# Patient Record
Sex: MALE | Race: WHITE | NOT HISPANIC OR LATINO | Employment: FULL TIME | URBAN - METROPOLITAN AREA
[De-identification: names, ages, dates, MRNs, and addresses within clinical notes are randomized per-mention and may not be internally consistent; named-entity substitution may affect disease eponyms.]

---

## 2018-09-19 ENCOUNTER — HISTORICAL (OUTPATIENT)
Dept: ADMINISTRATIVE | Facility: HOSPITAL | Age: 58
End: 2018-09-19

## 2018-09-19 LAB
ABS NEUT (OLG): 3.2
ALBUMIN SERPL-MCNC: 4.5 GM/DL (ref 3.4–5)
ALBUMIN/GLOB SERPL: 1.96 {RATIO} (ref 1.5–2.5)
ALP SERPL-CCNC: 44 UNIT/L (ref 38–126)
ALT SERPL-CCNC: 15 UNIT/L (ref 7–52)
APPEARANCE, UA: CLEAR
AST SERPL-CCNC: 15 UNIT/L (ref 15–37)
BACTERIA #/AREA URNS AUTO: NORMAL /HPF
BILIRUB SERPL-MCNC: 0.7 MG/DL (ref 0.2–1)
BILIRUB UR QL STRIP: NEGATIVE MG/DL
BILIRUBIN DIRECT+TOT PNL SERPL-MCNC: 0.2 MG/DL (ref 0–0.5)
BILIRUBIN DIRECT+TOT PNL SERPL-MCNC: 0.5 MG/DL
BUN SERPL-MCNC: 18 MG/DL (ref 7–18)
CALCIUM SERPL-MCNC: 9.3 MG/DL (ref 8.5–10)
CHLORIDE SERPL-SCNC: 108 MMOL/L (ref 98–107)
CHOLEST SERPL-MCNC: 120 MG/DL (ref 0–200)
CHOLEST/HDLC SERPL: 3.1 {RATIO}
CO2 SERPL-SCNC: 24 MMOL/L (ref 21–32)
COLOR UR: YELLOW
CREAT SERPL-MCNC: 1.17 MG/DL (ref 0.6–1.3)
ERYTHROCYTE [DISTWIDTH] IN BLOOD BY AUTOMATED COUNT: 13.2 % (ref 11.5–17)
GLOBULIN SER-MCNC: 2.3 GM/DL (ref 1.2–3)
GLUCOSE (UA): NEGATIVE MG/DL
GLUCOSE SERPL-MCNC: 119 MG/DL (ref 74–106)
HCT VFR BLD AUTO: 43.2 % (ref 42–52)
HDLC SERPL-MCNC: 39 MG/DL (ref 35–60)
HGB BLD-MCNC: 14.3 GM/DL (ref 14–18)
HGB UR QL STRIP: NEGATIVE UNIT/L
KETONES UR QL STRIP: NEGATIVE MG/DL
LDLC SERPL CALC-MCNC: 78 MG/DL (ref 0–129)
LEUKOCYTE ESTERASE UR QL STRIP: NEGATIVE UNIT/L
LYMPHOCYTES # BLD AUTO: 2 X10(3)/MCL (ref 0.6–3.4)
LYMPHOCYTES NFR BLD AUTO: 35.1 % (ref 13–40)
MCH RBC QN AUTO: 31.6 PG (ref 27–31.2)
MCHC RBC AUTO-ENTMCNC: 33 GM/DL (ref 32–36)
MCV RBC AUTO: 95 FL (ref 80–94)
MONOCYTES # BLD AUTO: 0.4 X10(3)/MCL (ref 0–1.8)
MONOCYTES NFR BLD AUTO: 7.6 % (ref 0.1–24)
NEUTROPHILS NFR BLD AUTO: 57.3 % (ref 47–80)
NITRITE UR QL STRIP.AUTO: NEGATIVE
PH UR STRIP: 6.5 [PH]
PLATELET # BLD AUTO: 255 X10(3)/MCL (ref 130–400)
PMV BLD AUTO: 9.9 FL
POTASSIUM SERPL-SCNC: 4.5 MMOL/L (ref 3.5–5.1)
PROT SERPL-MCNC: 6.8 GM/DL (ref 6.4–8.2)
PROT UR QL STRIP: NEGATIVE MG/DL
PSA SERPL-MCNC: 1.35 NG/ML (ref 0–3.5)
RBC # BLD AUTO: 4.53 X10(6)/MCL (ref 4.7–6.1)
RBC #/AREA URNS HPF: NORMAL /HPF
SODIUM SERPL-SCNC: 139 MMOL/L (ref 136–145)
SP GR UR STRIP: 1.02
SQUAMOUS EPITHELIAL, UA: NORMAL /LPF
TRIGL SERPL-MCNC: 57 MG/DL (ref 30–150)
UROBILINOGEN UR STRIP-ACNC: 0.2 MG/DL
VLDLC SERPL CALC-MCNC: 11.4 MG/DL
WBC # SPEC AUTO: 5.6 X10(3)/MCL (ref 4.5–11.5)
WBC #/AREA URNS AUTO: NORMAL /[HPF]

## 2020-06-18 ENCOUNTER — HISTORICAL (OUTPATIENT)
Dept: ADMINISTRATIVE | Facility: HOSPITAL | Age: 60
End: 2020-06-18

## 2020-06-18 LAB
ABS NEUT (OLG): 3.4 X10(3)/MCL (ref 2.1–9.2)
ALBUMIN SERPL-MCNC: 4.8 GM/DL (ref 3.4–5)
ALBUMIN/GLOB SERPL: 2.18 {RATIO} (ref 1.5–2.5)
ALP SERPL-CCNC: 50 UNIT/L (ref 38–126)
ALT SERPL-CCNC: 15 UNIT/L (ref 7–52)
APPEARANCE, UA: CLEAR
AST SERPL-CCNC: 16 UNIT/L (ref 15–37)
BACTERIA #/AREA URNS AUTO: NORMAL /HPF
BILIRUB SERPL-MCNC: 1.2 MG/DL (ref 0.2–1)
BILIRUB UR QL STRIP: NEGATIVE MG/DL
BILIRUBIN DIRECT+TOT PNL SERPL-MCNC: 0.2 MG/DL (ref 0–0.5)
BILIRUBIN DIRECT+TOT PNL SERPL-MCNC: 1 MG/DL
BUN SERPL-MCNC: 11 MG/DL (ref 7–18)
CALCIUM SERPL-MCNC: 10 MG/DL (ref 8.5–10)
CHLORIDE SERPL-SCNC: 105 MMOL/L (ref 98–107)
CHOLEST SERPL-MCNC: 146 MG/DL (ref 0–200)
CHOLEST/HDLC SERPL: 3.6 {RATIO}
CO2 SERPL-SCNC: 28 MMOL/L (ref 21–32)
COLOR UR: YELLOW
CREAT SERPL-MCNC: 1.05 MG/DL (ref 0.6–1.3)
ERYTHROCYTE [DISTWIDTH] IN BLOOD BY AUTOMATED COUNT: 13 % (ref 11.5–17)
GLOBULIN SER-MCNC: 2.2 GM/DL (ref 1.2–3)
GLUCOSE (UA): NEGATIVE MG/DL
GLUCOSE SERPL-MCNC: 98 MG/DL (ref 74–106)
HCT VFR BLD AUTO: 42.9 % (ref 42–52)
HDLC SERPL-MCNC: 40 MG/DL (ref 35–60)
HGB BLD-MCNC: 14.4 GM/DL (ref 14–18)
HGB UR QL STRIP: NEGATIVE UNIT/L
KETONES UR QL STRIP: NEGATIVE MG/DL
LDLC SERPL CALC-MCNC: 86 MG/DL (ref 0–129)
LEUKOCYTE ESTERASE UR QL STRIP: NEGATIVE UNIT/L
LYMPHOCYTES # BLD AUTO: 2 X10(3)/MCL (ref 0.6–3.4)
LYMPHOCYTES NFR BLD AUTO: 34.7 % (ref 13–40)
MCH RBC QN AUTO: 30.6 PG (ref 27–31.2)
MCHC RBC AUTO-ENTMCNC: 34 GM/DL (ref 32–36)
MCV RBC AUTO: 91 FL (ref 80–94)
MONOCYTES # BLD AUTO: 0.5 X10(3)/MCL (ref 0.1–1.3)
MONOCYTES NFR BLD AUTO: 8.5 % (ref 0.1–24)
NEUTROPHILS NFR BLD AUTO: 56.8 % (ref 47–80)
NITRITE UR QL STRIP.AUTO: NEGATIVE
PH UR STRIP: 7 [PH]
PLATELET # BLD AUTO: 302 X10(3)/MCL (ref 130–400)
PMV BLD AUTO: 9.5 FL (ref 9.4–12.4)
POTASSIUM SERPL-SCNC: 4.3 MMOL/L (ref 3.5–5.1)
PROT SERPL-MCNC: 7 GM/DL (ref 6.4–8.2)
PROT UR QL STRIP: NEGATIVE MG/DL
PSA SERPL-MCNC: 1.56 NG/ML (ref 0–4.5)
RBC # BLD AUTO: 4.7 X10(6)/MCL (ref 4.7–6.1)
RBC #/AREA URNS HPF: NORMAL /HPF
SODIUM SERPL-SCNC: 139 MMOL/L (ref 136–145)
SP GR UR STRIP: 1.02
SQUAMOUS EPITHELIAL, UA: NORMAL /LPF
TRIGL SERPL-MCNC: 97 MG/DL (ref 30–150)
UROBILINOGEN UR STRIP-ACNC: 0.2 MG/DL
VLDLC SERPL CALC-MCNC: 19.4 MG/DL
WBC # SPEC AUTO: 5.9 X10(3)/MCL (ref 4.5–11.5)
WBC #/AREA URNS AUTO: NORMAL /[HPF]

## 2020-11-20 LAB — CRC RECOMMENDATION EXT: NORMAL

## 2020-11-25 ENCOUNTER — HISTORICAL (OUTPATIENT)
Dept: RADIOLOGY | Facility: HOSPITAL | Age: 60
End: 2020-11-25

## 2020-12-18 ENCOUNTER — HISTORICAL (OUTPATIENT)
Dept: ENDOSCOPY | Facility: HOSPITAL | Age: 60
End: 2020-12-18

## 2021-05-06 LAB
ABS NEUT (OLG): 5.31 X10(3)/MCL (ref 2.1–9.2)
BASOPHILS # BLD AUTO: 0 X10(3)/MCL (ref 0–0.2)
BASOPHILS NFR BLD AUTO: 0 %
BUN SERPL-MCNC: 11.8 MG/DL (ref 8.4–25.7)
CALCIUM SERPL-MCNC: 9.9 MG/DL (ref 8.8–10)
CHLORIDE SERPL-SCNC: 103 MMOL/L (ref 98–107)
CO2 SERPL-SCNC: 26 MMOL/L (ref 23–31)
CREAT SERPL-MCNC: 1.03 MG/DL (ref 0.73–1.18)
CREAT/UREA NIT SERPL: 11
EOSINOPHIL # BLD AUTO: 0.2 X10(3)/MCL (ref 0–0.9)
EOSINOPHIL NFR BLD AUTO: 2 %
ERYTHROCYTE [DISTWIDTH] IN BLOOD BY AUTOMATED COUNT: 13.1 % (ref 11.5–17)
GLUCOSE SERPL-MCNC: 89 MG/DL (ref 82–115)
HCT VFR BLD AUTO: 42.4 % (ref 42–52)
HGB BLD-MCNC: 14.2 GM/DL (ref 14–18)
LYMPHOCYTES # BLD AUTO: 2.4 X10(3)/MCL (ref 0.6–4.6)
LYMPHOCYTES NFR BLD AUTO: 28 %
MCH RBC QN AUTO: 31.1 PG (ref 27–31)
MCHC RBC AUTO-ENTMCNC: 33.5 GM/DL (ref 33–36)
MCV RBC AUTO: 92.8 FL (ref 80–94)
MONOCYTES # BLD AUTO: 0.6 X10(3)/MCL (ref 0.1–1.3)
MONOCYTES NFR BLD AUTO: 7 %
NEUTROPHILS # BLD AUTO: 5.31 X10(3)/MCL (ref 2.1–9.2)
NEUTROPHILS NFR BLD AUTO: 62 %
PLATELET # BLD AUTO: 294 X10(3)/MCL (ref 130–400)
PMV BLD AUTO: 10.5 FL (ref 9.4–12.4)
POTASSIUM SERPL-SCNC: 4.3 MMOL/L (ref 3.5–5.1)
RBC # BLD AUTO: 4.57 X10(6)/MCL (ref 4.7–6.1)
SARS-COV-2 RNA RESP QL NAA+PROBE: NOT DETECTED
SODIUM SERPL-SCNC: 142 MMOL/L (ref 136–145)
WBC # SPEC AUTO: 8.5 X10(3)/MCL (ref 4.5–11.5)

## 2021-05-12 ENCOUNTER — HISTORICAL (OUTPATIENT)
Dept: SURGERY | Facility: HOSPITAL | Age: 61
End: 2021-05-12

## 2021-05-13 LAB
ABS NEUT (OLG): 9.75 X10(3)/MCL (ref 2.1–9.2)
BASOPHILS # BLD AUTO: 0 X10(3)/MCL (ref 0–0.2)
BASOPHILS NFR BLD AUTO: 0 %
BUN SERPL-MCNC: 14.2 MG/DL (ref 8.4–25.7)
CALCIUM SERPL-MCNC: 9.1 MG/DL (ref 8.8–10)
CHLORIDE SERPL-SCNC: 106 MMOL/L (ref 98–107)
CO2 SERPL-SCNC: 23 MMOL/L (ref 23–31)
CREAT SERPL-MCNC: 0.91 MG/DL (ref 0.73–1.18)
CREAT/UREA NIT SERPL: 16
ERYTHROCYTE [DISTWIDTH] IN BLOOD BY AUTOMATED COUNT: 13.4 % (ref 11.5–17)
GLUCOSE SERPL-MCNC: 111 MG/DL (ref 82–115)
HCT VFR BLD AUTO: 39.6 % (ref 42–52)
HGB BLD-MCNC: 13.4 GM/DL (ref 14–18)
LYMPHOCYTES # BLD AUTO: 1.2 X10(3)/MCL (ref 0.6–4.6)
LYMPHOCYTES NFR BLD AUTO: 10 %
MCH RBC QN AUTO: 31.2 PG (ref 27–31)
MCHC RBC AUTO-ENTMCNC: 33.8 GM/DL (ref 33–36)
MCV RBC AUTO: 92.1 FL (ref 80–94)
MONOCYTES # BLD AUTO: 0.7 X10(3)/MCL (ref 0.1–1.3)
MONOCYTES NFR BLD AUTO: 6 %
NEUTROPHILS # BLD AUTO: 9.75 X10(3)/MCL (ref 2.1–9.2)
NEUTROPHILS NFR BLD AUTO: 83 %
PLATELET # BLD AUTO: 274 X10(3)/MCL (ref 130–400)
PMV BLD AUTO: 10.4 FL (ref 9.4–12.4)
POTASSIUM SERPL-SCNC: 4.3 MMOL/L (ref 3.5–5.1)
RBC # BLD AUTO: 4.3 X10(6)/MCL (ref 4.7–6.1)
SODIUM SERPL-SCNC: 142 MMOL/L (ref 136–145)
WBC # SPEC AUTO: 11.7 X10(3)/MCL (ref 4.5–11.5)

## 2022-04-10 ENCOUNTER — HISTORICAL (OUTPATIENT)
Dept: ADMINISTRATIVE | Facility: HOSPITAL | Age: 62
End: 2022-04-10

## 2022-04-25 VITALS
HEIGHT: 72 IN | DIASTOLIC BLOOD PRESSURE: 98 MMHG | WEIGHT: 181.19 LBS | BODY MASS INDEX: 24.54 KG/M2 | SYSTOLIC BLOOD PRESSURE: 130 MMHG

## 2022-04-30 NOTE — DISCHARGE SUMMARY
DISCHARGE DATE:  05/13/2021    Krystal Braun, nurse practitioner, dictating a discharge summary for Jcarlos Calderon MD.    ADMITTING DIAGNOSES:    1. Hiatal hernia.  2. Gastroesophageal reflux disease.  3. Esophageal dysmotility.    DISCHARGE DIAGNOSES:    1. Hiatal hernia.  2. Gastroesophageal reflux disease.  3. Esophageal dysmotility.    PROCEDURE:  Laparoscopic Toupet fundoplication and laparoscopic hiatal hernia repair performed by Dr. Calderon on 05/12/2021.    HOSPITAL COURSE:  Mr. Linares is a 60-year-old male with symptomatic hiatal hernia, intractable reflux, and decreased esophageal motility.  He presented to [facility] for an elective laparoscopic anti-reflux procedure.  Procedure performed as stated above.  Upon completion of procedure, the patient was transferred from the recovery room to the postsurgical floor for further care and treatment.  On postop day 1 review of systems, mild incisional pain reported.  Tolerable at present.  No nausea, vomiting, heartburn, dysphagia, or reflux reported.  The patient voiding without difficulty.  Ambulating in the hallway.  Tolerating water by mouth this morning without issues.  Review of systems otherwise negative.    PHYSICAL EXAM:  GENERAL:  Alert and oriented.  Resting comfortably in bed.     HEENT:  Normocephalic.     NECK:  Supple.   RESPIRATORY:  Clear to auscultation bilaterally.     CARDIOVASCULAR:  Normal rate and regular rhythm.     GI:  Soft, nondistended, nontender.  Bowel sounds are present in all four quadrants.  Lap sites clean, dry, and intact.  Abdominal binder in place.     MUSCULOSKELETAL:  Normal range of motion.     INTEGUMENTARY:  Warm, dry.     NEUROLOGIC:  Alert and oriented x4.    LABS:  Unremarkable on postop day 1.       Once patient tolerating p.o., the patient was prepared for discharge.  Discharge instructions given regarding activity, diet, and medications.  Prescription given for pain medications as needed.  Followup  appointment arranged with Dr. Calderon in 2 weeks post discharge.    DISPOSITION:  To home with family.        ______________________________  Jcarlos Calderon MD PG/ADALI  DD:  05/13/2021  Time:  08:38AM  DT:  05/13/2021  Time:  09:23AM  Job #:  [id number]

## 2022-04-30 NOTE — H&P
Patient:   Fritz Linares            MRN: [id number]            FIN: [id number]               Age:   60 years     Sex:  Male     :  1960   Associated Diagnoses:   Esophageal dysmotility; Hernia, hiatal   Author:   Jcarlos Calderon MD      Basic Information   Admit information:  Presents for Lap Toupet.  MOSQUERA reveals GERD, HH, decreased esophageal motility .       Health Status   Allergies:    Allergic Reactions (Selected)  No Known Allergies   Current medications:  (Selected)   Prescriptions  Prescribed  Pepcid 20 mg oral tablet: 20 mg = 1 tab(s), Oral, BID, # 180 tab(s), 1 Refill(s), Pharmacy: [id number], 182, cm, Height/Length Dosing, 20 10:47:00 CDT, 82.2, kg, Weight Dosing, 20 10:47:00 CDT  meloxicam 15 mg oral tablet: 15 mg = 1 tab(s), Oral, Daily, # 90 tab(s), 1 Refill(s), Pharmacy: [id number], 182, cm, Height/Length Dosing, 20 10:47:00 CDT, 82.2, kg, Weight Dosing, 20 10:47:00 CDT  Documented Medications  Documented  Suprep Bowel Prep Kit oral liquid:   Tums 500 (1250 mg calcium carbonate) oral tablet, chewable: 2,500 mg = 2 tab(s), Oral, Daily, PRN indigestion, 0 Refill(s)  ibuprofen 200 mg oral capsule: 400 mg = 2 cap(s), Oral, q4hr, PRN PRN for pain, 0 Refill(s)  ibuprofen 800 mg oral tablet: BID, 0 Refill(s)   Problem list:    All Problems  Umbilical hernia / SNOMED CT 9864357664 / Confirmed  Acid reflux / SNOMED CT 982024454 / Confirmed  Arthritis of spine / SNOMED CT 6003346863 / Confirmed  chronic back pain  Wellness examination / SNOMED CT 989021235 / Confirmed  Elevated glucose / SNOMED CT 394135394 / Confirmed  Allergic rhinitis / SNOMED CT 098834306 / Confirmed  No-show for appointment / SNOMED CT 253438716 / Confirmed      Histories   Past Medical History:    Active  Umbilical hernia (2262335234)  Acid reflux (748356029)  Arthritis of spine (4388171920)  Comments:  10/6/2012 CDT 20:28 CDT - Tom WICK, Narcisa Mendez  chronic  back pain   Procedure history:    Motility High Pressure Topography Study (None) on 12/18/2020 at 60 Years.  Comments:  12/18/2020 9:06 WILLY - Meg WICK, Max BLEDSOE  auto-populated from documented surgical case  History of repair of inguinal hernia (6900637249).  Comments:  10/6/2012 20:30 MURPHYT - Tom WICK , Narcisa Mendez  as a child   Social History        Social & Psychosocial Habits    Alcohol  10/06/2012 Risk Assessment: Denies Alcohol Use    05/09/2021  Use: Past    Employment/School  10/06/2012  Highest education: Some college    05/09/2021  Status: Employed    Description:     Highest education: High school    Home/Environment  10/06/2012  Lives with: Spouse    Nutrition/Health  10/06/2012  Type of diet: Regular    Substance Use  10/06/2012 Risk Assessment: Denies Substance Abuse    Tobacco  10/06/2012 Risk Assessment: Denies Tobacco Use    10/22/2019  Use: Never (less than 100 in l    Patient Wants Consult For Cessation Counseling N/A    12/17/2020  Use: Never (less than 100 in l    Patient Wants Consult For Cessation Counseling No    05/09/2021  Use: Former smoker, quit more    Patient Wants Consult For Cessation Counseling N/A    Abuse/Neglect  06/18/2020  SHX Any signs of abuse or neglect No    12/17/2020  SHX Any signs of abuse or neglect No    Feels unsafe at home: No    Safe place to go: Yes    05/09/2021  SHX Any signs of abuse or neglect No    Feels unsafe at home: No    Safe place to go: Yes    Spiritual/Cultural  05/09/2021  Scientologist Preference Sabianist  .        Physical Examination   General:  Alert and oriented.    HENT:  Normocephalic.    Neck:  Supple.    Respiratory:  Lungs are clear to auscultation.    Cardiovascular:  Normal rate.    Gastrointestinal:  Normal, Abdomen.    Genitourinary:  No costovertebral angle tenderness.    Musculoskeletal:  Normal range of motion.    Integumentary:  Warm.    Neurologic:  Alert.       Impression and Plan   Diagnosis     Esophageal dysmotility  (COS98-JM K22.4).     Hernia, hiatal (XJG86-BM K44.9).       partial fundoplication  HH repair

## 2022-04-30 NOTE — OP NOTE
DATE OF SURGERY:    12/18/2020    SURGEON:  Nicho Arthur MD    PROCEDURE:  Esophageal motility study.    PREOPERATIVE DIAGNOSIS:  Mr. Linares is a 60-year-old male referred to us for evaluation of epigastric pain.  His esophagogastroduodenoscopy was notable for a hiatal hernia 4 cm in size.  He is referred to General Surgery to consider repair of the hiatal hernia.  Preoperative esophagram shows good motility without stricture and mild reflux.    POSTOPERATIVE DIAGNOSES:    1. Ineffective esophageal peristalsis/motility.  2. Hypo-contraction of lower esophageal sphincter.    DESCRIPTION OF PROCEDURE:  After informed consent was signed, patient was taken back to endoscopy and given the manometric probe through the right naris into the appropriate position.  A total of 14 swallows was performed with the following results.     Lower esophageal sphincter readings show hypo-contraction.  Basal pressure -6.1 and residual pressure of -9.4.  This is consistent with hypo-contraction, and this also mirrored on image tracings.     Esophageal motility shows 14 swallows, 93% failed swallows with 86% panesophageal pressurization.  Distal contractile integral is 998.  Mean wave amplitude 16.5.    DISCUSSION:  Overall, this patient presents to us for evaluation of epigastric discomfort.  He admits to occasional dysphagia, but this is rare.  He was seen in the emergency room setting, where cardiac etiologies were ruled out.  Today's manometric tracing shows concern for hypomotility of the esophageal body and hypo-contraction of the lower esophageal sphincter.  This tracing is similar to that of scleroderma.  He denies any autoimmune history.  No skin findings.    RECOMMENDATION:  He would like to address the hiatal hernia as he believes this may be causing some of his discomfort.  If moving forward with hiatal hernia repair, I would recommend a loose wrap or perhaps the LINX system, as this would be a reversible  option.       I am happy to further discuss these findings, if needed.        ______________________________  MD WILBUR Bonilla/CHUCK  DD:  12/30/2020  Time:  02:36PM  DT:  12/30/2020  Time:  03:06PM  Job #:  [id number]    cc: Jcarlos Calderon MD

## 2022-04-30 NOTE — OP NOTE
Patient:   Fritz Linares            MRN: [id number]            FIN: [id number]               Age:   60 years     Sex:  Male     :  1960   Associated Diagnoses:   GERD (gastroesophageal reflux disease); Hiatal hernia; Hiatal hernia; GERD (gastroesophageal reflux disease)   Author:   Jcarlos Calderon MD      Operative Note   Operative Information   Procedures Performed: Procedure Code   Fundoplication Toupet Laparoscopic (None) on 2021 at 60 Years.  Comments:  2021 13:02 HANNAH Esparza RN, Yancy LINO  auto-populated from documented surgical case, TOUPET FUNDOPLICATION.     Preoperative Diagnosis: GERD (gastroesophageal reflux disease) (ZFM47-FX K21.9), Hiatal hernia (WVH47-HJ K44.9).     Postoperative Diagnosis: GERD (gastroesophageal reflux disease) (HLY40-GE K21.9), Hiatal hernia (QKG35-IT K44.9).     Surgeon: Jcarlos Calderon MD.     Assistant: Mathew Faust MD     Anesthesia: General Endotracheal.     Speciman Removed: None.     Description of Procedure/Findings/    Complications: After informed consent the patient was taken to the operating room. The patient was placed under general anesthesia successfully. The abdomen was prepped and draped in the usual sterile fashion. An appropriate time out was performed.    An optical-tipped trocar was utilized to access the peritoneal cavity. Pneumoperitoneum was established under direct vision. Additional working ports were placed under direct vision. There was no injury to any viscera upon placement of the trocars.  A self-retaining liver retractor was placed. The abdomen was explored, and no pathology other than the hiatal hernia was identified.    A moderate-sized reducible hernia was noted. Harmonic scalpel was utilized to open the gastrohepatic ligament and identified the right chepe of the diaphragm. Further dissection delineated the anterior and posterior decussation near the right chepe of the diaphragm. The esophagus  with the attached vagus nerve was identified and protected. The left chepe of the diaphragm was completely dissected free. Mediastinal dissection was performed to allow reduction of the esophagus into the abdominal cavity. The greater curvature and fundus of the stomach was completely mobilized with harmonic scalpel. The short gastric vessels were transected with harmonic scalpel. Hemostasis was assured. Left and right vagus nerve were identified and protected. A blue vessel loop was placed around the esophagus at the gastroesophageal junction and controlled with an Endoloop tie. After adequate mobilization was achieved a 56 tapered bougie was placed under direct vision through the esophagus into the stomach. The hiatus was reapproximated with interrupted 0 Ethibond both posteriorly and anteriorly. A posterior fundus 270° wrap was achieved 5 cm posteriorly and 3 cm anteriorly. 2 Ethibond suture was used in to fix the wrap to the right and left crura.  Next the fundoplication was performed by utilized 2 0 Ethibond sutures from the fundus to the esophagus on the right and left. Prior to placement of sutures the wrap was checked for excellent geometry. The bougie was removed. The wrap appeared to be in excellent position. There was at least 4 cm of intra-abdominal esophagus. Hemostasis was once again assured. The liver retractor was removed from the peritoneal cavity. Laparotomy sponge and instrument count were correct. Pneumoperitoneum was released, trocars were removed. Skin incisions were closed with subcuticular 4-0 Vicryl suture.    The patient was allowed to emerge from anesthesia and was brought to the recovery room in stable condition..     Esimated blood loss: loss less than  25  cc.     Findings: Hiatal hernia.     Complications: None.

## 2022-05-03 NOTE — HISTORICAL [facility] CERNER
This is a historical note converted from Messi. Formatting and pictures may have been removed.  Please reference Messi for original formatting and attached multimedia. Chief Complaint  difficult swallowing food  History of Present Illness  Patient presents for his wellness.? His wife accompanies him. ?His main complaint is that he?is having difficulty swallowing?several times a week.?? Not with every meal. ?Sometimes causes hiccups.??. Takes TUMS nightly. Works in [employer]. No etoh or nicotine.? Exercises on weekends.  , 2 children, no grands.  Caffeine 2 mugs coffee in AM, green tea - 3-4 per day, occasional soft drink  Normal cardiac w/u 2019.  Cataract surgery on Left in 2019  ?  Father  at 76 : Colitis, COPD, CAD at 76  Mother  at 76 :? Alzheimers  2 sisters, 63 and 66 : 1 with Breast CA.  ?  UROL : Nichol  GI : Sandhya, colonoscopy  - polyps due   Review of Systems  General:??????????????? Patient reports energy level is good. Denies weight change. ?Denies fever,chills, night sweats, fatigue or weakness.  Integument:???????? rash left forearm for a year.  HEENT:?????????????????? Denies vision changes or eye pain.? No sore throat, ear pain, sinus pressure or discharge.  Cardiovascular:??? Denies chest pain, palpitations, dyspnea on exertion, orthopnea.  Respiratory:???????? No cough, wheezing, shortness of breath, or sputum.  GI:????????????????????????? Denies nausea, emesis, constipation, diarrhea, melena, hematochezia or abdominal pain  :??????????????????????? No frequency, urgency, hematuria, discharge, or incontinence  MS:????????????????????LBP, takes ibuprofen.  Neuro:????????????????? No headaches, numbness in extremities, tingling, dizziness, or weakness  Psych:?????????????????? Denies anxiety, depression, suicidal or homicidal ideations, or irritability  Endo:??????????????????? Denies polyuria, polydipsia, polyphagia  Heme:????????????????? No abnormal  bleeding or bruising. No lymph node enlargement or pain.  Physical Exam  Vitals & Measurements  HR:?80(Peripheral)? BP:?130/98?  HT:?182?cm? WT:?82.2?kg? BMI:?24.82?  General :?????Well-developed and? nourished, no apparent distress, alert and oriented ?4  Integument :????? Skin is intact with no erythema.? No pustules or vesicles.? No rash or scale. No Lymphadenopathy.? No urticaria.? No abnormal nevi  HEENT :?????PATEL, EOMI ; TMs and EACs clear, normal turbinates with no erythema, normal mucosa, no sinus tenderness; no erythema or exudate of mouth or pharynx  Neck :?????Supple, no lymphadenopathy, no thyromegaly, no bruits, no jugular venous distention  Cardiovascular :?????? Regular rhythm and rate, no murmurs, radial and dorsal pedal pulses 2+ bilaterally  Respiratory :??????Lungs clear to auscultation bilaterally, no wheezes, no crackles, no rhonchi.? Good air movement  Abdomen :?????? ?NABS, soft, nontender, no hepatosplenomegaly, no masses, no guarding or rebound??????????????????????????  MS :??????? Lumbar muscle spasm with tenderness that is exacerbated range of motion, FROM, negative SLR, no?CCE  Neuro :? ?????? CN II-XII intact, reflexes 2+ throughout, no motor sensory deficits, negative?cerebellar? tests  Psych :??????Normal affect, patient calm, good historian, patient answers questions??? appropriately.????Good hygiene? ??  Heme :?????? No bruising or petechiae?  Assessment/Plan  1.?Wellness examination?Z00.00  ?Age-appropriate wellness topics discussed. ?He is up-to-date on his colonoscopy screening.? Fasting labs will be done today and follow-up to be determined.  2.?Esophageal spasm?K22.4  ?Likely due to GERD, see #3.? Advised to avoid cold liquids with meals.  3.?GERD (gastroesophageal reflux disease)?K21.9  ?Options discussed. ?Will avoid PPI? due to?that class of medications possible?complications. ?Prescribed Pepcid 20 mg twice daily.  4.?Low back pain?M54.5  ?Demonstrated back stretches.  ?Discussed proper posture.? Prescribed meloxicam 15 mg.? If he fails to improve then would consider physical therapy.   Problem List/Past Medical History  Ongoing  Acid reflux  Allergic rhinitis  Arthritis of spine  Elevated glucose  No-show for appointment  Umbilical hernia  Wellness examination  Historical  No qualifying data  Procedure/Surgical History  Other open umbilical herniorrhaphy (10/10/2012)  Repair umbilical hernia, age 5 years or older; reducible. (10/10/2012)  History of repair of inguinal hernia   Medications  ibuprofen 800 mg oral tablet, BID  meloxicam 15 mg oral tablet, 15 mg= 1 tab(s), Oral, Daily, 1 refills  Pepcid 20 mg oral tablet, 20 mg= 1 tab(s), Oral, BID, 1 refills  Tums 500 (1250 mg calcium carbonate) oral tablet, chewable, 2500 mg= 2 tab(s), Oral, Daily, PRN  Allergies  No Known Allergies  Social History  Abuse/Neglect  No, 06/18/2020  No, 10/22/2019  Alcohol - Denies Alcohol Use, 10/06/2012  Employment/School  Highest education level: Some college., 10/06/2012  Home/Environment  Lives with Spouse., 10/06/2012  Nutrition/Health  Regular, 10/06/2012  Substance Use - Denies Substance Abuse, 10/06/2012  Tobacco - Denies Tobacco Use, 10/06/2012  Never (less than 100 in lifetime), No, 06/18/2020  Never (less than 100 in lifetime), N/A, 10/22/2019  Health Maintenance  Health Maintenance  ???Pending?(in the next year)  ??? ??OverDue  ??? ? ? ?Diabetes Screening due??and every?  ??? ? ? ?Alcohol Misuse Screening due??01/01/20??and every 1??year(s)  ??? ??Due?  ??? ? ? ?ADL Screening due??06/24/20??and every 1??year(s)  ??? ? ? ?Aspirin Therapy for CVD Prevention due??06/24/20??and every 1??year(s)  ??? ? ? ?Colorectal Screening due??06/24/20??and every?  ??? ? ? ?Depression Screening due??06/24/20??and every?  ??? ? ? ?Tetanus Vaccine due??06/24/20??and every 10??year(s)  ??? ? ? ?Zoster Vaccine due??06/24/20??and every 100??year(s)  ??? ??Due In Future?  ??? ? ? ?Obesity Screening not due  until??01/01/21??and every 1??year(s)  ??? ? ? ?Blood Pressure Screening not due until??06/18/21??and every 1??year(s)  ??? ? ? ?Body Mass Index Check not due until??06/18/21??and every 1??year(s)  ???Satisfied?(in the past 1 year)  ??? ??Satisfied?  ??? ? ? ?Blood Pressure Screening on??06/18/20.??Satisfied by Estefania Martin  ??? ? ? ?Body Mass Index Check on??06/18/20.??Satisfied by Estefania Martin  ??? ? ? ?Diabetes Screening on??06/18/20.??Satisfied by Nikita Gallo  ??? ? ? ?Influenza Vaccine on??10/22/19.??Satisfied by Mely Whiting LPN  ??? ? ? ?Lipid Screening on??06/18/20.??Satisfied by Nikita Gallo  ??? ? ? ?Obesity Screening on??06/18/20.??Satisfied by Estefania Martin  ?

## 2022-07-12 PROBLEM — I10 HYPERTENSION: Status: ACTIVE | Noted: 2022-07-12

## 2023-02-02 ENCOUNTER — DOCUMENTATION ONLY (OUTPATIENT)
Dept: ADMINISTRATIVE | Facility: HOSPITAL | Age: 63
End: 2023-02-02

## 2023-04-14 PROBLEM — G43.709 CHRONIC MIGRAINE WITHOUT AURA WITHOUT STATUS MIGRAINOSUS, NOT INTRACTABLE: Status: ACTIVE | Noted: 2023-04-14

## 2024-06-25 PROBLEM — K63.5 POLYP OF COLON: Status: ACTIVE | Noted: 2024-06-25

## 2024-06-25 PROBLEM — M15.0 PRIMARY OSTEOARTHRITIS INVOLVING MULTIPLE JOINTS: Status: ACTIVE | Noted: 2024-06-25

## 2024-06-25 PROBLEM — R35.1 NOCTURIA: Status: ACTIVE | Noted: 2024-06-25

## 2025-02-17 ENCOUNTER — TELEPHONE (OUTPATIENT)
Dept: SURGERY | Facility: CLINIC | Age: 65
End: 2025-02-17

## 2025-02-17 NOTE — TELEPHONE ENCOUNTER
Krystal- is it okay to schedule esophogram prior to scheduling appt?    Uploaded cerner records to media.

## 2025-02-25 DIAGNOSIS — R13.10 DYSPHAGIA, UNSPECIFIED TYPE: Primary | ICD-10-CM

## 2025-02-26 NOTE — TELEPHONE ENCOUNTER
I called the pt to schedule. He will get his UGI on 3/10/25 @ 130 at [facility]

## 2025-03-10 ENCOUNTER — HOSPITAL ENCOUNTER (OUTPATIENT)
Dept: RADIOLOGY | Facility: HOSPITAL | Age: 65
Discharge: HOME | End: 2025-03-10
Attending: SURGERY
Payer: COMMERCIAL

## 2025-03-10 DIAGNOSIS — R13.10 DYSPHAGIA, UNSPECIFIED TYPE: ICD-10-CM

## 2025-03-10 PROCEDURE — A9698 NON-RAD CONTRAST MATERIALNOC: HCPCS | Performed by: SURGERY

## 2025-03-10 PROCEDURE — 25500020 PHARM REV CODE 255: Performed by: SURGERY

## 2025-03-10 PROCEDURE — 74220 X-RAY XM ESOPHAGUS 1CNTRST: CPT | Mod: TC

## 2025-03-10 RX ADMIN — BARIUM SULFATE 100 ML: 0.6 SUSPENSION ORAL at 01:03

## 2025-03-10 RX ADMIN — BARIUM SULFATE 100 ML: 980 POWDER, FOR SUSPENSION ORAL at 01:03

## 2025-03-12 ENCOUNTER — RESULTS FOLLOW-UP (OUTPATIENT)
Dept: SURGERY | Facility: CLINIC | Age: 65
End: 2025-03-12

## 2025-03-12 NOTE — PROGRESS NOTES
Please inform patient esophagram revealed small amount of reflux and a small hiatal hernia. We will discuss next steps at his upcoming appt 3/24/25.

## 2025-03-24 ENCOUNTER — OFFICE VISIT (OUTPATIENT)
Dept: SURGERY | Facility: CLINIC | Age: 65
End: 2025-03-24
Payer: COMMERCIAL

## 2025-03-24 VITALS
DIASTOLIC BLOOD PRESSURE: 83 MMHG | HEIGHT: 72 IN | BODY MASS INDEX: 23.84 KG/M2 | HEART RATE: 85 BPM | SYSTOLIC BLOOD PRESSURE: 150 MMHG | WEIGHT: 176 LBS

## 2025-03-24 DIAGNOSIS — Z01.818 PREOP EXAMINATION: ICD-10-CM

## 2025-03-24 DIAGNOSIS — Z98.890 HISTORY OF FUNDOPLICATION: ICD-10-CM

## 2025-03-24 DIAGNOSIS — K44.9 HIATAL HERNIA: ICD-10-CM

## 2025-03-24 DIAGNOSIS — K21.9 GASTROESOPHAGEAL REFLUX DISEASE, UNSPECIFIED WHETHER ESOPHAGITIS PRESENT: Primary | ICD-10-CM

## 2025-03-24 PROCEDURE — 1126F AMNT PAIN NOTED NONE PRSNT: CPT | Mod: CPTII,,, | Performed by: SURGERY

## 2025-03-24 PROCEDURE — 1101F PT FALLS ASSESS-DOCD LE1/YR: CPT | Mod: CPTII,,, | Performed by: SURGERY

## 2025-03-24 PROCEDURE — 3008F BODY MASS INDEX DOCD: CPT | Mod: CPTII,,, | Performed by: SURGERY

## 2025-03-24 PROCEDURE — 3079F DIAST BP 80-89 MM HG: CPT | Mod: CPTII,,, | Performed by: SURGERY

## 2025-03-24 PROCEDURE — 99204 OFFICE O/P NEW MOD 45 MIN: CPT | Mod: ,,, | Performed by: SURGERY

## 2025-03-24 PROCEDURE — 3077F SYST BP >= 140 MM HG: CPT | Mod: CPTII,,, | Performed by: SURGERY

## 2025-03-24 PROCEDURE — 3288F FALL RISK ASSESSMENT DOCD: CPT | Mod: CPTII,,, | Performed by: SURGERY

## 2025-03-24 PROCEDURE — 1159F MED LIST DOCD IN RCRD: CPT | Mod: CPTII,,, | Performed by: SURGERY

## 2025-03-24 RX ORDER — PANTOPRAZOLE SODIUM 40 MG/1
40 TABLET, DELAYED RELEASE ORAL DAILY
Qty: 30 TABLET | Refills: 2 | Status: SHIPPED | OUTPATIENT
Start: 2025-03-24 | End: 2025-06-22

## 2025-03-24 RX ORDER — IBUPROFEN 200 MG
200 TABLET ORAL EVERY 6 HOURS PRN
COMMUNITY

## 2025-03-24 NOTE — PROGRESS NOTES
[facility] Surgical - General Surgery Services  [address] [place] [id number]  Phone: [phone]  Fax: [phone]     HISTORY & PHYSICAL  General Surgery  Dr. Jcarlos Calderon      Patient Name: Fritz Linares  YOB: 1960  Author: Krystal Braun, ANDRAE     Date: 03/24/2025                   SUBJECTIVE:     Chief Complaint/Reason for Admission:   Chief Complaint   Patient presents with    Consult     Hiatal Hernia - Esophagram 3/10/25          History of Present Illness:  Mr. Fritz Linares is a 64 y.o. male who presents to clinic for surgical evaluation of recurrent symptoms of heartburn, dysphagia, and hiccups.    Nearly 4 years s/p lap toupet fundoplication and lap hiatal hernia 5/12/2021 per Dr. Calderon for GERD, HH, and esophageal dysmotility.     Non-smoker    No ETOH    Taking OTC Tums without relief.    Symptoms present for 3 months. Worsening. Especially dysphagia, after meals.     Referring provider: CAROLIN Hankins Jr.*   Patient Care Team:  CAROLIN Hankins Jr., MD as PCP - General (Family Medicine)  Jcarlos Calderon MD (General Surgery)     Pertinent workup:    FL Esophagram Complete  Narrative: EXAMINATION:  FL ESOPHAGRAM COMPLETE    CLINICAL HISTORY:  Dysphagia, unspecified    TECHNIQUE:  The patient was given oral contrast and multiple fluoroscopic images of the esophagus obtained in various positions. Absorbed dose is 65.19 mGy.    COMPARISON:  Upper GI 11/25/2020    FINDINGS:  Contrast passes promptly from the mouth to the stomach with no significant esophageal narrowing identified.  Esophageal peristalsis within normal limits.  Suspect previous fundoplication with wrap just below the diaphragm.  Possible very small hiatal hernia just above the wrap reaching 2 cm above the diaphragm.  Small volume reflux.  No esophageal diverticulum appreciated.  Impression: Small volume reflux with possible very small hiatal  hernia.    Electronically signed by: Ted Contreras  Date:    03/11/2025  Time:    10:42      Review of Systems:  12 point ROS negative except as stated in HPI    PAST HISTORY:     Past Medical History:   Diagnosis Date    Acid reflux     Allergic rhinitis due to pollen     Arthritis of spine     Elevated glucose     HTN (hypertension)     Migraine     Personal history of colonic polyps     Personal history of colonic polyps     Umbilical hernia      Past Surgical History:   Procedure Laterality Date    COLONOSCOPY  11/20/2020    ESOPHAGEAL MOTILITY STUDY  12/18/2020    LAPAROSCOPIC TOUPET FUNDOPLICATION  05/12/2021    Dr. Calderon    UMBILICAL HERNIA REPAIR  10/10/2012     Family History   Problem Relation Name Age of Onset    Alzheimer's disease Mother      Ulcerative colitis Father      COPD Father      Coronary artery disease Father      Breast cancer Sister      No Known Problems Sister       Social History     Socioeconomic History    Marital status:     Number of children: 2   Occupational History    Occupation:  for [employer], Works in [employer]   Tobacco Use    Smoking status: Never    Smokeless tobacco: Never   Substance and Sexual Activity    Alcohol use: Not Currently    Drug use: Never       MEDICATIONS & ALLERGIES:     Current Outpatient Medications on File Prior to Visit   Medication Sig    celecoxib (CELEBREX) 200 MG capsule Take 1 Cap Oral Daily with Food as needed for Joint Pain    ibuprofen (ADVIL,MOTRIN) 200 MG tablet Take 200 mg by mouth every 6 (six) hours as needed for Pain.    valsartan (DIOVAN) 160 MG tablet Take 1 tablet (160 mg total) by mouth once daily.    sildenafiL (VIAGRA) 100 MG tablet Take 1 Tab Oral as needed on an empty stomach 60 minutes prior to intercourse (Patient not taking: Reported on 2/19/2024)    [DISCONTINUED] amLODIPine (NORVASC) 2.5 MG tablet Take 1 tablet (2.5 mg total) by mouth once daily.     No current facility-administered medications on file  prior to visit.     Review of patient's allergies indicates:  No Known Allergies    OBJECTIVE:     Vitals:    03/24/25 1428   BP: (!) 150/83   Pulse: 85   Weight: 79.8 kg (176 lb)   Height: 6' (1.829 m)     Body mass index is 23.87 kg/m².    Physical Exam:  General:  Well developed, well nourished, no acute distress  HEENT:  Normocephalic, atraumatic, PERRL, EOMI, clear sclera, neck supple  CVS:  RRR, S1 and S2 normal, no murmurs, rubs, gallops  Resp:  Lungs clear to auscultation, no wheezes, rales, rhonchi, cough  GI:  Abdomen soft, non-tender, non-distended, normoactive bowel sounds, no masses  :   Deferred  MSK:  No muscle atrophy, cyanosis, peripheral edema, full range of motion  Skin:  No rashes, ulcers, erythema  Neuro:  CNII-XII grossly intact  Psych:  Alert and oriented to person, place, and time    Results:  I have independently reviewed all pertinent lab and radiologic studies relevant to general surgery.      VISIT DIAGNOSES:     1. Gastroesophageal reflux disease, unspecified whether esophagitis present        2. Preop examination        3. Hiatal hernia  Ambulatory referral/consult to General Surgery      4. History of fundoplication              ASSESSMENT/PLAN:     Plan:     Symptoms severe per patient.    Esophagram : possible small recurrent hiatal and mild reflux.     4 years s/p Lap Toupet (partial fundoplication) fundoplication and Lap hiatal hernia repair.     Attempt medical mgmt while awaiting GI evaluation.     Refer back to Dr. Nicho Arthur for EGD (consider BRAVO pH too to better evaluate for presence of reflux)    FU after workup to determine if surgical intervention required.     RX Sent today for 3 month supply of Pantoprazole 40 mg daily.         - Dr. Calderon examined patient, discussed recommendations, obtained informed consent, and answered all questions with patient/family.     - Counseling included differential diagnoses, treatment options, risks and benefits, lifestyle  changes, prognosis, and medications.    The patient was interactive, and verbalized understanding.    ANDRAE Cardoza      I, ANDRAE Tran, am scribing for, and in the presence of, Jcarlos Calderon MD, performed the above scribed service and the documentation accurately describes the services I performed. I attest to the accuracy of the note.

## 2025-04-30 ENCOUNTER — TELEPHONE (OUTPATIENT)
Dept: SURGERY | Facility: CLINIC | Age: 65
End: 2025-04-30
Payer: COMMERCIAL

## 2025-04-30 NOTE — TELEPHONE ENCOUNTER
"----- Message from Med Assistant Pagan sent at 4/9/2025 10:46 AM CDT -----  Regarding: RE: GI referral  4.9.25 Spoke to pratima bauman they stated his referral is "pending"  ----- Message -----  From: Latasha Taylor MA  Sent: 4/7/2025  12:00 AM CDT  To: [facility] Staff  Subject: GI referral                                      Referred to Dr Arthur office for EGD (consider BRAVO pH too to better evaluate for presence of reflux).. FU on scheduling . Then ov notes/ egd.  "

## 2025-05-13 ENCOUNTER — TELEPHONE (OUTPATIENT)
Dept: SURGERY | Facility: CLINIC | Age: 65
End: 2025-05-13
Payer: COMMERCIAL

## 2025-05-13 NOTE — TELEPHONE ENCOUNTER
"----- Message from Med Assistant Latasha sent at 4/30/2025  1:59 PM CDT -----  Regarding: RE: GI referral  Pt scheduled for 5.5.25 w/ dr abreu and EGD . FU on results and reports  ----- Message -----  From: Latasha Taylor MA  Sent: 4/22/2025  12:00 AM CDT  To: [facility] Staff  Subject: RE: GI referral                                  4.9.25 Spoke to [facility] gastro they stated his referral is "pending"  ----- Message -----  From: Latasha Taylor MA  Sent: 4/7/2025  12:00 AM CDT  To: [facility] Staff  Subject: GI referral                                      Referred to Dr Arthur office for EGD (consider BRAVO pH too to better evaluate for presence of reflux).. FU on scheduling . Then ov notes/ egd.  "